# Patient Record
(demographics unavailable — no encounter records)

---

## 2024-12-29 NOTE — PHYSICAL EXAM
[No Acute Distress] : no acute distress [Well Developed] : well developed [Well Nourished] : well nourished [Well-Appearing] : well-appearing [Normal Sclera/Conjunctiva] : normal sclera/conjunctiva [PERRL] : pupils equal round and reactive to light [EOMI] : extraocular movements intact [Normal Outer Ear/Nose] : the outer ears and nose were normal in appearance [Normal Oropharynx] : the oropharynx was normal [No JVD] : no jugular venous distention [No Lymphadenopathy] : no lymphadenopathy [Supple] : supple [Thyroid Normal, No Nodules] : the thyroid was normal and there were no nodules present [No Respiratory Distress] : no respiratory distress  [No Accessory Muscle Use] : no accessory muscle use [Clear to Auscultation] : lungs were clear to auscultation bilaterally [Normal Rate] : normal rate  [Regular Rhythm] : with a regular rhythm [Normal S1, S2] : normal S1 and S2 [No Murmur] : no murmur heard [Pedal Pulses Present] : the pedal pulses are present [Soft] : abdomen soft [Non Tender] : non-tender [Non-distended] : non-distended [No Masses] : no abdominal mass palpated [No HSM] : no HSM [Normal Bowel Sounds] : normal bowel sounds [Normal Posterior Cervical Nodes] : no posterior cervical lymphadenopathy [Normal Anterior Cervical Nodes] : no anterior cervical lymphadenopathy [No CVA Tenderness] : no CVA  tenderness [No Spinal Tenderness] : no spinal tenderness [No Joint Swelling] : no joint swelling [Grossly Normal Strength/Tone] : grossly normal strength/tone [No Rash] : no rash [Coordination Grossly Intact] : coordination grossly intact [No Focal Deficits] : no focal deficits [Normal Gait] : normal gait [Deep Tendon Reflexes (DTR)] : deep tendon reflexes were 2+ and symmetric [Normal Affect] : the affect was normal [Normal Insight/Judgement] : insight and judgment were intact [de-identified] : bilateral bulging varicose veins

## 2024-12-29 NOTE — PHYSICAL EXAM
[No Acute Distress] : no acute distress [Well Nourished] : well nourished [Well Developed] : well developed [Well-Appearing] : well-appearing [Normal Sclera/Conjunctiva] : normal sclera/conjunctiva [PERRL] : pupils equal round and reactive to light [EOMI] : extraocular movements intact [Normal Outer Ear/Nose] : the outer ears and nose were normal in appearance [Normal Oropharynx] : the oropharynx was normal [No JVD] : no jugular venous distention [No Lymphadenopathy] : no lymphadenopathy [Supple] : supple [Thyroid Normal, No Nodules] : the thyroid was normal and there were no nodules present [No Respiratory Distress] : no respiratory distress  [No Accessory Muscle Use] : no accessory muscle use [Clear to Auscultation] : lungs were clear to auscultation bilaterally [Normal Rate] : normal rate  [Regular Rhythm] : with a regular rhythm [Normal S1, S2] : normal S1 and S2 [No Murmur] : no murmur heard [Pedal Pulses Present] : the pedal pulses are present [Soft] : abdomen soft [Non Tender] : non-tender [Non-distended] : non-distended [No Masses] : no abdominal mass palpated [No HSM] : no HSM [Normal Bowel Sounds] : normal bowel sounds [Normal Posterior Cervical Nodes] : no posterior cervical lymphadenopathy [Normal Anterior Cervical Nodes] : no anterior cervical lymphadenopathy [No CVA Tenderness] : no CVA  tenderness [No Spinal Tenderness] : no spinal tenderness [No Joint Swelling] : no joint swelling [Grossly Normal Strength/Tone] : grossly normal strength/tone [No Rash] : no rash [Coordination Grossly Intact] : coordination grossly intact [No Focal Deficits] : no focal deficits [Normal Gait] : normal gait [Deep Tendon Reflexes (DTR)] : deep tendon reflexes were 2+ and symmetric [Normal Affect] : the affect was normal [Normal Insight/Judgement] : insight and judgment were intact [de-identified] : bilateral bulging varicose veins

## 2024-12-29 NOTE — HISTORY OF PRESENT ILLNESS
[FreeTextEntry1] : Annual [de-identified] : 57 yo F with diabetes mellitus 2, hypertension, asthma, obesity with BMI greater than 40, hyperlipidemia, bilateral varicose veins, chronic lumbar radiculopathy, hypothyroidism s/p partial thyroidectomy, presenting for routine annual physical. Patient has not been compliant in returning for office visits throughout the year. She has lost some weight on her own with dieting and food choices but also at times limited in exercise due to chronic lumbar radiculopathy. Patient currently denies any headaches, chest pain, shortness of breath, nausea/vomiting, diarrhea, constipation, changes in vision. She is overdue for mammogram.

## 2024-12-29 NOTE — PLAN
[FreeTextEntry1] : 57 yo F with diabetes mellitus 2, hypertension, asthma, obesity with BMI greater than 40, hyperlipidemia, bilateral varicose veins, chronic lumbar radiculopathy, hypothyroidism s/p partial thyroidectomy, presenting for routine annual physical. Patient has not been compliant in returning for office visits throughout the year. She has lost some weight on her own with dieting and food choices but also at times limited in exercise due to chronic lumbar radiculopathy. Patient currently denies any headaches, chest pain, shortness of breath, nausea/vomiting, diarrhea, constipation, changes in vision. She is overdue for mammogram. DM2 - Last a1c was 8.2%, uncontrolled. Patient will benefit from GLP1 agonist like Ozempic for Diabetes management as well as weight loss. Dietary discussion held as well as importance of exercise. Start ozempic low dose and titrate q4 weeks while monitoring for side effects. No contraindications to medication at this time. Wean off metformin eventually given success of ozempic in managing a1c. Labs drawn in office today. HTN - BP at goal, continue all medications, labs drawn Varicose veins - referral to vascular surgery for consultation for management, pt declines compression garments at this time Bilateral hip and knee pain - likely osteoarthritis, weight based. Obtain xrays at this time Hypothyroidism - labs drawn, follow up thyroid u/s Mammogram ordered Asthma well controlled Follow up 3 months for DM2 management

## 2024-12-29 NOTE — HEALTH RISK ASSESSMENT
[Yes] : Yes [Monthly or less (1 pt)] : Monthly or less (1 point) [1 or 2 (0 pts)] : 1 or 2 (0 points) [Never (0 pts)] : Never (0 points) [No falls in past year] : Patient reported no falls in the past year [0] : 2) Feeling down, depressed, or hopeless: Not at all (0) [Former] : Former [10-14] : 10-14 [Patient reported mammogram was normal] : Patient reported mammogram was normal [Patient declined PAP Smear] : Patient declined PAP Smear [Patient declined bone density test] : Patient declined bone density test [Patient reported colonoscopy was normal] : Patient reported colonoscopy was normal [Good] : ~his/her~  mood as  good [PHQ-2 Negative - No further assessment needed] : PHQ-2 Negative - No further assessment needed [de-identified] : pain management [Audit-CScore] : 1 [[id number]] : 0 [Patient reported colonoscopy was abnormal] : Patient reported colonoscopy was abnormal [None] : None [With Family] : lives with family [Employed] : employed [Feels Safe at Home] : Feels safe at home [Fully functional (bathing, dressing, toileting, transferring, walking, feeding)] : Fully functional (bathing, dressing, toileting, transferring, walking, feeding) [Fully functional (using the telephone, shopping, preparing meals, housekeeping, doing laundry, using] : Fully functional and needs no help or supervision to perform IADLs (using the telephone, shopping, preparing meals, housekeeping, doing laundry, using transportation, managing medications and managing finances) [Reports changes in hearing] : Reports no changes in hearing [Reports changes in vision] : Reports no changes in vision [Reports normal functional visual acuity (ie: able to read med bottle)] : Reports normal functional visual acuity [MammogramDate] : 09/22 [MammogramComments] : overdue [PapSmearComments] : Hysterectomy [ColonoscopyDate] : 08/22 [ColonoscopyComments] : polyps; repeat 5 years

## 2024-12-29 NOTE — HEALTH RISK ASSESSMENT
[Yes] : Yes [Monthly or less (1 pt)] : Monthly or less (1 point) [1 or 2 (0 pts)] : 1 or 2 (0 points) [Never (0 pts)] : Never (0 points) [No falls in past year] : Patient reported no falls in the past year [0] : 2) Feeling down, depressed, or hopeless: Not at all (0) [Former] : Former [10-14] : 10-14 [Patient reported mammogram was normal] : Patient reported mammogram was normal [Patient declined PAP Smear] : Patient declined PAP Smear [Patient declined bone density test] : Patient declined bone density test [Patient reported colonoscopy was normal] : Patient reported colonoscopy was normal [Good] : ~his/her~  mood as  good [PHQ-2 Negative - No further assessment needed] : PHQ-2 Negative - No further assessment needed [de-identified] : pain management [Audit-CScore] : 1 [[id number]] : 0 [Patient reported colonoscopy was abnormal] : Patient reported colonoscopy was abnormal [None] : None [With Family] : lives with family [Employed] : employed [Feels Safe at Home] : Feels safe at home [Fully functional (bathing, dressing, toileting, transferring, walking, feeding)] : Fully functional (bathing, dressing, toileting, transferring, walking, feeding) [Fully functional (using the telephone, shopping, preparing meals, housekeeping, doing laundry, using] : Fully functional and needs no help or supervision to perform IADLs (using the telephone, shopping, preparing meals, housekeeping, doing laundry, using transportation, managing medications and managing finances) [Reports changes in hearing] : Reports no changes in hearing [Reports changes in vision] : Reports no changes in vision [Reports normal functional visual acuity (ie: able to read med bottle)] : Reports normal functional visual acuity [MammogramDate] : 09/22 [MammogramComments] : overdue [PapSmearComments] : Hysterectomy [ColonoscopyDate] : 08/22 [ColonoscopyComments] : polyps; repeat 5 years

## 2024-12-29 NOTE — HISTORY OF PRESENT ILLNESS
[FreeTextEntry1] : Annual [de-identified] : 57 yo F with diabetes mellitus 2, hypertension, asthma, obesity with BMI greater than 40, hyperlipidemia, bilateral varicose veins, chronic lumbar radiculopathy, hypothyroidism s/p partial thyroidectomy, presenting for routine annual physical. Patient has not been compliant in returning for office visits throughout the year. She has lost some weight on her own with dieting and food choices but also at times limited in exercise due to chronic lumbar radiculopathy. Patient currently denies any headaches, chest pain, shortness of breath, nausea/vomiting, diarrhea, constipation, changes in vision. She is overdue for mammogram.

## 2025-01-31 NOTE — PROCEDURE
[de-identified] : patients wants a cortisone injection.  risks, benefits, and alternatives discussed and patient gave consent.  under sterile conditions using the anterolateral portal i injected 80mg of depomedrol and 2cc of 0.25% marcaine.  patient tolerated procedure well and will rest and ice and fu when injection wears off. both knees

## 2025-01-31 NOTE — PHYSICAL EXAM
[de-identified] : both hips with painful restricted range of motion Knee ranges 5-115 degrees with pain and crepitus stable to varus, valgus, anterior and posterior stress neurovascularly intact distally no pain with hip range of motion negative straight leg raise no effusion, synovitis, or edema or erythema skin intact  right hip has clean and intact skin [de-identified] : hip xrays shows severe arthritis with bone on bone contact, subchondral sclerosis and osteophytes and cysts right and left  multiple views of the knees show severe arthritis with bone on bone contact, jing-articular osteophytes, subchondral sclerosis and cysts both

## 2025-01-31 NOTE — HISTORY OF PRESENT ILLNESS
[de-identified] : Patient comes in with more than 6 months of right hip pain atraumatic in onset.  Patient has tried greater than 6 months of nsaids, physical therapy and doctor directed exercises.  Patient continues to have pain that is 8/10 in severity and interferes with activities of daily living such as putting on shoes and socks, walking two blocks, and getting up and down from a chair and toilet.  hip osteoarthritis outcome score is 8.2 bl knee pain as well but not as bad has been loosing weight and just started physical therapy

## 2025-01-31 NOTE — DISCUSSION/SUMMARY
[de-identified] : worst issue is right hip OA she is a little big for surgery but her skin is clean and not too thick over anterior thigh This patient has failed non-operative treatments for right hip arthritis and is now indicated for a total hip replacement.  I had a long discussion with the patient regarding the plan, the expected outcome, the risks, benefits, and alternatives of surgery. The risks include, but are not limited to, infection (which may require future surgery and removal of implants) , bleeding (which may require a transfusion), damage to nerves, arteries, veins, tendons, muscles and other adjacent structures, leading to numbness, weakness, continued pain, need for surgical repair, or decreased function. Also discussed the possibilities of dislocation, leg-length discrepancy, intraoperative and post-operative fractures, implant loosening, heterotopic bone formation, and revision for variety of reasons. We also discussed blood clots and other medical complications. This was discussed at length and consent has been obtained tried bl knee cortisone injections will come back in 6 weeks, she is going to look into her insurance deductible for surgery for right jareth will see how she does with knee injections

## 2025-02-21 NOTE — PHYSICAL EXAM
[Normal Breath Sounds] : Normal breath sounds [Alert] : alert [Oriented to Person] : oriented to person [Oriented to Place] : oriented to place [Oriented to Time] : oriented to time [Varicose Veins Of Lower Extremities] : bilaterally [Ankle Swelling On The Left] : moderate [JVD] : no jugular venous distention  [Ankle Swelling (On Exam)] : present [Ankle Swelling Bilaterally] : bilaterally  [Ankle Swelling On The Right] : mild [de-identified] : Awake and alert [de-identified] : large varicose veins >3mm, left medial thigh and calf >right medial thigh [de-identified] :  No gross motor or sensory deficits. [de-identified] : Appropriate

## 2025-02-21 NOTE — ASSESSMENT
[FreeTextEntry1] : 56 year old female with long standing bilateral lower extremity varicose veins, left more prominent than right. She has worsening pain and swelling associated with her varicose veins.   I am suspicious that she has venous insufficiency.  I recommended that she begin to wear compression stockings daily. She will elevate her legs as much as possible. She was scheduled for a lower extremity venous duplex. She will follow up with me when the results of the duplex are available. Further treatment depends upon the results of the duplex and her response to conservative compression therapy.

## 2025-02-21 NOTE — REVIEW OF SYSTEMS
[Fever] : no fever [Chills] : no chills [Lower Ext Edema] : lower extremity edema [Joint Swelling] : no joint swelling [Limb Pain] : limb pain [Limb Swelling] : limb swelling

## 2025-02-21 NOTE — END OF VISIT
[FreeTextEntry3] :  All medical record entries made by the arinaibe were at [email or web address], direction and personally dictated by me on 2/21/2025. I have reviewed the chart and agree that the record accurately reflects my personal performance of the history, physical exam, assessment, and plan. I have also personally directed, reviewed, and agreed with the chart.

## 2025-02-21 NOTE — HISTORY OF PRESENT ILLNESS
[FreeTextEntry1] : 56 year old female, referred by Dr. Mack, for painful large varicose veins, left greater than right. The patient reports worsening pain and swelling over the last several months. Patient reports a longstanding history of varicose veins. She does not currently wear compression stockings.  She denies a history of DVT or SVT . She is here to discuss treatment options for her veins.

## 2025-04-20 NOTE — HISTORY OF PRESENT ILLNESS
[FreeTextEntry1] : f/u [de-identified] : 55 yo F with diabetes mellitus 2, hypertension, asthma, obesity with BMI greater than 40, hyperlipidemia, bilateral varicose veins, chronic lumbar radiculopathy, hypothyroidism s/p partial thyroidectomy, presenting for follow up. She has been using ozempic for combined DM2 management and weight loss, with A1c in 2/23 at 8.2% and 6.8% in 12/24. She denies any side effects taking the medications. She has lost weight since being on the medication.

## 2025-04-20 NOTE — PLAN
[FreeTextEntry1] : 57 yo F with diabetes mellitus 2, hypertension, asthma, obesity with BMI greater than 40, hyperlipidemia, bilateral varicose veins, chronic lumbar radiculopathy, hip and knee arthritis, hypothyroidism s/p partial thyroidectomy, presenting for follow up. She has been using ozempic for combined DM2 management and weight loss, with A1c in 2/23 at 8.2% and 6.8% in 12/24. She denies any side effects taking the medications. She has lost weight since being on the medication. DM2 - continue ozempic, jardiance, labs drawn in office, 3 month follow up Arthritis - severe arthritis; discussing with ortho regarding hip replacement; using cane for ambulation currently; would like to lose more weight prior to surgery All questions answered

## 2025-04-20 NOTE — HEALTH RISK ASSESSMENT
[Yes] : Yes [Monthly or less (1 pt)] : Monthly or less (1 point) [1 or 2 (0 pts)] : 1 or 2 (0 points) [Never (0 pts)] : Never (0 points) [No falls in past year] : Patient reported no falls in the past year [0] : 2) Feeling down, depressed, or hopeless: Not at all (0) [PHQ-2 Negative - No further assessment needed] : PHQ-2 Negative - No further assessment needed [Audit-CScore] : 1 [[id number]] : 0 [Former] : Former [10-14] : 10-14 [> 15 Years] : > 15 Years

## 2025-04-20 NOTE — PHYSICAL EXAM
[Normal] : affect was normal and insight and judgment were intact [de-identified] : antalgic gait

## 2025-04-25 NOTE — PHYSICAL EXAM
[de-identified] : right hip with painful restricted rom neuro intact antalgic gait nvid [de-identified] : 3 views both hips hip xrays shows severe arthritis with bone on bone contact, subchondral sclerosis and osteophytes and cysts

## 2025-04-25 NOTE — HISTORY OF PRESENT ILLNESS
[de-identified] : Patient comes in with more than 6 months of right hip pain atraumatic in onset.  Patient has tried greater than 6 months of nsaids, physical therapy and doctor directed exercises and injections.  Patient continues to have pain that is 8/10 in severity and interferes with activities of daily living such as putting on shoes and socks, walking two blocks, and getting up and down from a chair and toilet.  hip osteoarthritis outcome score is 8.2

## 2025-04-25 NOTE — DISCUSSION/SUMMARY
[de-identified] : This patient has failed non-operative treatments for right hip arthritis and is now indicated for a total hip replacement.  I had a long discussion with the patient regarding the plan, the expected outcome, the risks, benefits, and alternatives of surgery. The risks include, but are not limited to, infection (which may require future surgery and removal of implants) , bleeding (which may require a transfusion), damage to nerves, arteries, veins, tendons, muscles and other adjacent structures, leading to numbness, weakness, continued pain, need for surgical repair, or decreased function. Also discussed the possibilities of dislocation, leg-length discrepancy, intraoperative and post-operative fractures, implant loosening, heterotopic bone formation, and revision for variety of reasons. We also discussed blood clots and other medical complications. This was discussed at length and consent has been obtained

## 2025-07-18 NOTE — HISTORY OF PRESENT ILLNESS
[de-identified] : patient is about one month out from their joint replacement.  overall doing well.  no major complaints.  pain well controlled.

## 2025-07-18 NOTE — DISCUSSION/SUMMARY
[de-identified] : one month out from joint replacement doing well we discussed medications, activities, precautions and fu

## 2025-07-18 NOTE — PHYSICAL EXAM
[de-identified] : incision is well healed, clean dry and intact hip with painless rom normal gait no motor or sensory deficits and neurovasculary intact no clubbing, cyanosis or edema  [de-identified] : jareth looks good